# Patient Record
Sex: FEMALE | Race: BLACK OR AFRICAN AMERICAN | Employment: FULL TIME | URBAN - METROPOLITAN AREA
[De-identification: names, ages, dates, MRNs, and addresses within clinical notes are randomized per-mention and may not be internally consistent; named-entity substitution may affect disease eponyms.]

---
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
## 2021-04-29 ENCOUNTER — HOSPITAL ENCOUNTER (EMERGENCY)
Facility: HOSPITAL | Age: 21
Discharge: HOME | End: 2021-04-29
Attending: EMERGENCY MEDICINE
Payer: MEDICAID

## 2021-04-29 ENCOUNTER — TELEPHONE (OUTPATIENT)
Dept: OBGYN CLINIC | Facility: CLINIC | Age: 21
End: 2021-04-29

## 2021-04-29 VITALS
HEART RATE: 100 BPM | DIASTOLIC BLOOD PRESSURE: 80 MMHG | RESPIRATION RATE: 18 BRPM | SYSTOLIC BLOOD PRESSURE: 126 MMHG | BODY MASS INDEX: 33.13 KG/M2 | TEMPERATURE: 97 F | HEIGHT: 62 IN | OXYGEN SATURATION: 100 % | WEIGHT: 180 LBS

## 2021-04-29 DIAGNOSIS — N93.8 DYSFUNCTIONAL UTERINE BLEEDING: Primary | ICD-10-CM

## 2021-04-29 PROCEDURE — 99284 EMERGENCY DEPT VISIT MOD MDM: CPT

## 2021-04-29 PROCEDURE — 96361 HYDRATE IV INFUSION ADD-ON: CPT

## 2021-04-29 PROCEDURE — 96374 THER/PROPH/DIAG INJ IV PUSH: CPT

## 2021-04-29 PROCEDURE — 81025 URINE PREGNANCY TEST: CPT

## 2021-04-29 PROCEDURE — 85025 COMPLETE CBC W/AUTO DIFF WBC: CPT | Performed by: EMERGENCY MEDICINE

## 2021-04-29 RX ORDER — KETOROLAC TROMETHAMINE 30 MG/ML
15 INJECTION, SOLUTION INTRAMUSCULAR; INTRAVENOUS ONCE
Status: COMPLETED | OUTPATIENT
Start: 2021-04-29 | End: 2021-04-29

## 2021-04-29 NOTE — ED INITIAL ASSESSMENT (HPI)
Pt here with c.o heavy vaginal bleeding x 1 day.  Pt states her cycle started Tuesday, and bleeding became heavy wednesday

## 2021-04-29 NOTE — TELEPHONE ENCOUNTER
Pt calling to make ER follow up appt. She states she was to follow up in 2 days.    Pt is new and had [employer]

## 2021-04-30 NOTE — TELEPHONE ENCOUNTER
Patient called and informed her that with her insurance she would need to see her primary care doctor as we do not take her insurance. Patient verbalized understanding.

## (undated) NOTE — LETTER
Date & Time: 8/7/2020, 9:43 PM  Patient: Daphne Gallo  Encounter Provider(s):    Sami Melendez MD       To Whom It May Concern:    Daphne Gallo was seen and treated in our department on 8/7/2020. She can return to work.     If you have any qu